# Patient Record
Sex: FEMALE | Race: WHITE | Employment: FULL TIME | URBAN - METROPOLITAN AREA
[De-identification: names, ages, dates, MRNs, and addresses within clinical notes are randomized per-mention and may not be internally consistent; named-entity substitution may affect disease eponyms.]

---

## 2019-06-22 ENCOUNTER — HOSPITAL ENCOUNTER (EMERGENCY)
Age: 54
Discharge: HOME | End: 2019-06-22
Attending: EMERGENCY MEDICINE
Payer: COMMERCIAL

## 2019-06-22 VITALS
RESPIRATION RATE: 18 BRPM | WEIGHT: 190 LBS | SYSTOLIC BLOOD PRESSURE: 157 MMHG | DIASTOLIC BLOOD PRESSURE: 79 MMHG | HEIGHT: 60 IN | OXYGEN SATURATION: 99 % | TEMPERATURE: 98.9 F | HEART RATE: 64 BPM | BODY MASS INDEX: 37.3 KG/M2

## 2019-06-22 DIAGNOSIS — M26.629 TMJ SYNDROME: Primary | ICD-10-CM

## 2019-06-22 PROCEDURE — 74011636637 HC RX REV CODE- 636/637: Performed by: EMERGENCY MEDICINE

## 2019-06-22 PROCEDURE — 99283 EMERGENCY DEPT VISIT LOW MDM: CPT | Performed by: EMERGENCY MEDICINE

## 2019-06-22 RX ORDER — TRAMADOL HYDROCHLORIDE 50 MG/1
50 TABLET ORAL
Qty: 6 TAB | Refills: 0 | Status: SHIPPED | OUTPATIENT
Start: 2019-06-22 | End: 2019-06-25

## 2019-06-22 RX ORDER — PREDNISONE 10 MG/1
40 TABLET ORAL
Status: COMPLETED | OUTPATIENT
Start: 2019-06-22 | End: 2019-06-22

## 2019-06-22 RX ORDER — PREDNISONE 20 MG/1
40 TABLET ORAL DAILY
Qty: 10 TAB | Refills: 0 | Status: SHIPPED | OUTPATIENT
Start: 2019-06-22 | End: 2019-06-27

## 2019-06-22 RX ADMIN — PREDNISONE 40 MG: 10 TABLET ORAL at 09:09

## 2019-06-22 NOTE — ED PROVIDER NOTES
58-year-old white female presents with right ear and face pain onset gradually yesterday and worse this morning. Pain is aggravated by palpation, movement and chewing. She does not have any dental discomfort. No difficulty breathing or swallowing. No fever. No respiratory symptoms. The history is provided by the patient. Facial Pain    Pertinent negatives include no vomiting.         Past Medical History:   Diagnosis Date    Acute pancreatitis 4/10/2012    CAD (coronary artery disease)     heart murmur    Endometriosis     Heart murmur     Hepatic steatosis 4/11/2012    Hepatic steatosis 4/11/2012    Other ill-defined conditions(799.89)     endometriosis    Pancreatitis, gallstone 4/11/2012       Past Surgical History:   Procedure Laterality Date    HX APPENDECTOMY      lap x2    HX CHOLECYSTECTOMY      HX GYN  tubal ligation    HX ORTHOPAEDIC      right knee x2         Family History:   Problem Relation Age of Onset    Heart Disease Other        Social History     Socioeconomic History    Marital status:      Spouse name: Not on file    Number of children: Not on file    Years of education: Not on file    Highest education level: Not on file   Occupational History    Not on file   Social Needs    Financial resource strain: Not on file    Food insecurity:     Worry: Not on file     Inability: Not on file    Transportation needs:     Medical: Not on file     Non-medical: Not on file   Tobacco Use    Smoking status: Never Smoker    Smokeless tobacco: Never Used   Substance and Sexual Activity    Alcohol use: No     Comment: occassionally    Drug use: No    Sexual activity: Yes     Partners: Male     Birth control/protection: Surgical   Lifestyle    Physical activity:     Days per week: Not on file     Minutes per session: Not on file    Stress: Not on file   Relationships    Social connections:     Talks on phone: Not on file     Gets together: Not on file     Attends Anabaptist service: Not on file     Active member of club or organization: Not on file     Attends meetings of clubs or organizations: Not on file     Relationship status: Not on file    Intimate partner violence:     Fear of current or ex partner: Not on file     Emotionally abused: Not on file     Physically abused: Not on file     Forced sexual activity: Not on file   Other Topics Concern    Not on file   Social History Narrative    Not on file         ALLERGIES: Advil [ibuprofen] and Aleve [naproxen sodium]    Review of Systems   Constitutional: Negative for fever. HENT: Negative for congestion. Respiratory: Negative for cough. Gastrointestinal: Negative for abdominal pain and vomiting. Musculoskeletal: Negative for back pain and neck pain. Skin: Negative for rash. Neurological: Negative for headaches. Vitals:    06/22/19 0821   BP: 153/73   Pulse: 60   Resp: 16   Temp: 99.1 °F (37.3 °C)   SpO2: 99%   Weight: 86.2 kg (190 lb)   Height: 5' (1.524 m)            Physical Exam   Constitutional: She appears well-developed and well-nourished. No distress. HENT:   Head: Normocephalic and atraumatic. Mouth/Throat: Oropharynx is clear and moist. No oropharyngeal exudate. Uvula midline. Posterior pharynx is normal in appearance. There is tenderness to palpation of the right TMJ. TMs are normal in appearance. Eyes: Pupils are equal, round, and reactive to light. Conjunctivae and EOM are normal.   Neck: Normal range of motion. Neck supple. Cardiovascular: Normal rate. Pulmonary/Chest: Effort normal and breath sounds normal.   Lymphadenopathy:     She has no cervical adenopathy. Neurological: She is alert. Skin: Skin is warm and dry. Psychiatric: She has a normal mood and affect. Her behavior is normal.   Nursing note and vitals reviewed.        MDM  Number of Diagnoses or Management Options  Diagnosis management comments: No findings on exam to suggest sinusitis, dental infection, pharyngitis or otitis media. Considering she has pain to the TMJ with palpation and chewing. I suspect this is TMJ syndrome. We'll treat with prednisone and tramadol.        Amount and/or Complexity of Data Reviewed  Tests in the medicine section of CPT®: ordered and reviewed    Risk of Complications, Morbidity, and/or Mortality  Presenting problems: low  Diagnostic procedures: low  Management options: low           Procedures

## 2019-06-22 NOTE — DISCHARGE INSTRUCTIONS
Patient Education        Temporomandibular Disorder: Care Instructions  Your Care Instructions    Temporomandibular (TM) disorders are a problem with the muscles and joints that connect your jaw to your skull. They cause pain when you open your mouth, chew, or yawn. You may feel this pain on one or both sides. TM disorders are often caused by tight jaw muscles. The tightness can be caused by clenching or grinding your teeth. This may happen when you have a lot of stress in your life. If you lower your stress, you may be able to stop clenching or grinding your teeth. This will help relax your jaw and reduce your pain. You may also be able to do some things at home to feel better. But if none of this works, your doctor may prescribe medicine to help relax your muscles and control the pain. Follow-up care is a key part of your treatment and safety. Be sure to make and go to all appointments, and call your doctor if you are having problems. It's also a good idea to know your test results and keep a list of the medicines you take. How can you care for yourself at home? · Put a warm, moist cloth or heating pad set on low on your jaw. Do this for 10 to 20 minutes at a time. Put a thin cloth between the heating pad and your skin. · Avoid hard or chewy foods that cause your jaws to work very hard. Examples include popcorn, jerky, tough meats, chewy breads, gum, and raw apples and carrots. · Choose softer foods that are easy to chew. These include eggs, yogurt, and soup. · Cut your food into small pieces. Chew slowly. · If your jaw gets too painful to chew, or if it locks, you may need to puree your food for a few days or weeks. · To relax your jaw, repeat this exercise for a few minutes every morning and evening. Watch yourself in a mirror. Gently open and close your mouth. Move your jaw straight up and down. But don't do this if it makes your pain worse.   · Get at least 30 minutes of exercise on most days of the week to relieve stress. Walking is a good choice. You also may want to do other activities, such as running, swimming, cycling, or playing tennis or team sports. · Do not:  ? Hold a phone between your shoulder and your jaw. ? Open your mouth all the way, like when you sing loudly or yawn. ? Clench or grind your teeth, bite your lips, or chew your fingernails. ? Clench things such as pens, pipes, or cigars between your teeth. When should you call for help? Call your doctor now or seek immediate medical care if:    · Your jaw is locked open or shut or it is hard to move your jaw.    Watch closely for changes in your health, and be sure to contact your doctor if:    · Your jaw pain gets worse.     · Your face is swollen.     · You do not get better as expected. Where can you learn more? Go to http://sarah-amy.info/. Enter J835 in the search box to learn more about \"Temporomandibular Disorder: Care Instructions. \"  Current as of: March 27, 2018  Content Version: 11.9  © 1056-0702 [employer]. Care instructions adapted under license by [employer] (which disclaims liability or warranty for this information). If you have questions about a medical condition or this instruction, always ask your healthcare professional. [address] any warranty or liability for your use of this information.

## 2019-06-22 NOTE — ED TRIAGE NOTES
Patient advises that she started with some aching pain to right side of face yesterday and it has progressed throughout the night. Patient advises pain to ear, into jaw and increased pain with any jaw movement. Patient advises that she has not had any recent trauma, no known fever. Patient is is with redness to right side of face, stating she has been using a hot compress with mild relief.

## 2019-06-22 NOTE — ED NOTES
I have reviewed discharge instructions with the patient. The patient verbalized understanding. Patient left ED via Discharge Method: ambulatory to Home with self. Opportunity for questions and clarification provided. Patient given 2 scripts. Ricardo Garcia RN    To continue your aftercare when you leave the hospital, you may receive an automated call from our care team to check in on how you are doing. This is a free service and part of our promise to provide the best care and service to meet your aftercare needs.  If you have questions, or wish to unsubscribe from this service please call [phone]. Thank you for Choosing our [facility] Emergency Department.

## 2021-09-28 ENCOUNTER — APPOINTMENT (OUTPATIENT)
Dept: GENERAL RADIOLOGY | Age: 56
End: 2021-09-28
Attending: PHYSICIAN ASSISTANT
Payer: COMMERCIAL

## 2021-09-28 ENCOUNTER — HOSPITAL ENCOUNTER (EMERGENCY)
Age: 56
Discharge: HOME | End: 2021-09-28
Attending: STUDENT IN AN ORGANIZED HEALTH CARE EDUCATION/TRAINING PROGRAM
Payer: COMMERCIAL

## 2021-09-28 VITALS
BODY MASS INDEX: 37.3 KG/M2 | WEIGHT: 190 LBS | TEMPERATURE: 99.8 F | SYSTOLIC BLOOD PRESSURE: 124 MMHG | HEIGHT: 60 IN | OXYGEN SATURATION: 93 % | HEART RATE: 84 BPM | DIASTOLIC BLOOD PRESSURE: 88 MMHG | RESPIRATION RATE: 16 BRPM

## 2021-09-28 DIAGNOSIS — Z20.822 SUSPECTED COVID-19 VIRUS INFECTION: Primary | ICD-10-CM

## 2021-09-28 LAB
COVID-19 RAPID TEST, COVR: NOT DETECTED
SARS-COV-2, COV2: NORMAL
SOURCE, COVRS: NORMAL

## 2021-09-28 PROCEDURE — 71046 X-RAY EXAM CHEST 2 VIEWS: CPT

## 2021-09-28 PROCEDURE — U0004 COV-19 TEST NON-CDC HGH THRU: HCPCS

## 2021-09-28 PROCEDURE — 99281 EMR DPT VST MAYX REQ PHY/QHP: CPT

## 2021-09-28 PROCEDURE — 87635 SARS-COV-2 COVID-19 AMP PRB: CPT

## 2021-09-28 NOTE — Clinical Note
[facility]  [address] [address] [phone]    Work/School Note    Date: 9/28/2021     To Whom It May concern:    Louise Bueno was evaulated by the following provider(s):  Attending Provider: Fior Hernandez DO  Physician Assistant: Debra Montano, [address] virus is suspected. Per the CDC guidelines we recommend home isolation until the following conditions are all met:    1. At least 10 days have passed since symptoms first appeared and  2. At least 24 hours have passed since last fever without the use of fever-reducing medications and  3.  Symptoms (e.g., cough, shortness of breath) have improved    Sincerely,          MIKIE Zheng

## 2021-09-28 NOTE — ED PROVIDER NOTES
Patient is here with nasal congestion, dry cough, body aches, chills, fever and not feeling well for 2 days now. She did not get a COVID vaccination. No chest pain or shortness of breath, abdominal pain, dizziness, dyspnea on exertion, orthopnea, neck pain/stiffness, rash, swelling of her arms or legs, trouble with urination or bowel movements or other symptoms. She was ambulatory to the room without difficulty, and well-hydrated. The history is provided by the patient. Cough  This is a new problem. The current episode started yesterday. The problem occurs every few minutes. The problem has not changed since onset. The cough is non-productive. Patient reports a subjective fever - was not measured. Associated symptoms include chills, rhinorrhea and myalgias. Pertinent negatives include no chest pain, no sweats, no weight loss, no eye redness, no ear congestion, no ear pain, no headaches, no sore throat, no shortness of breath, no wheezing, no nausea, no vomiting and no confusion. She has tried nothing for the symptoms. She is not a smoker. Her past medical history does not include bronchitis, pneumonia, bronchiectasis, COPD, emphysema, asthma, cancer, heart failure or CHF.         Past Medical History:   Diagnosis Date    Acute pancreatitis 4/10/2012    CAD (coronary artery disease)     heart murmur    Endometriosis     Heart murmur     Hepatic steatosis 4/11/2012    Hepatic steatosis 4/11/2012    Other ill-defined conditions(799.89)     endometriosis    Pancreatitis, gallstone 4/11/2012       Past Surgical History:   Procedure Laterality Date    HX APPENDECTOMY      lap x2    HX CHOLECYSTECTOMY      HX GYN  tubal ligation    HX ORTHOPAEDIC      right knee x2         Family History:   Problem Relation Age of Onset    Heart Disease Other        Social History     Socioeconomic History    Marital status:      Spouse name: Not on file    Number of children: Not on file    Years of education: Not on file    Highest education level: Not on file   Occupational History    Not on file   Tobacco Use    Smoking status: Never Smoker    Smokeless tobacco: Never Used   Substance and Sexual Activity    Alcohol use: No     Comment: occassionally    Drug use: No    Sexual activity: Yes     Partners: Male     Birth control/protection: Surgical   Other Topics Concern    Not on file   Social History Narrative    Not on file     Social Determinants of Health     Financial Resource Strain:     Difficulty of Paying Living Expenses:    Food Insecurity:     Worried About Running Out of Food in the Last Year:     [address] in the Last Year:    Transportation Needs:     Lack of Transportation (Medical):  Lack of Transportation (Non-Medical):    Physical Activity:     Days of Exercise per Week:     Minutes of Exercise per Session:    Stress:     Feeling of Stress :    Social Connections:     Frequency of Communication with Friends and Family:     Frequency of Social Gatherings with Friends and Family:     Attends Muslim Services:     Active Member of Clubs or Organizations:     Attends Club or Organization Meetings:     Marital Status:    Intimate Partner Violence:     Fear of Current or Ex-Partner:     Emotionally Abused:     Physically Abused:     Sexually Abused: ALLERGIES: Advil [ibuprofen] and Aleve [naproxen sodium]    Review of Systems   Constitutional: Positive for chills. Negative for weight loss. HENT: Positive for rhinorrhea. Negative for ear pain and sore throat. Eyes: Negative. Negative for redness. Respiratory: Positive for cough. Negative for shortness of breath and wheezing. Cardiovascular: Negative. Negative for chest pain. Gastrointestinal: Negative. Negative for nausea and vomiting. Genitourinary: Negative. Musculoskeletal: Positive for myalgias. Skin: Negative. Neurological: Negative. Negative for headaches.    Psychiatric/Behavioral: Negative. Negative for confusion. All other systems reviewed and are negative. Vitals:    09/28/21 1352   BP: 124/88   Pulse: 84   Resp: 16   Temp: 99.8 °F (37.7 °C)   SpO2: 93%   Weight: 86.2 kg (190 lb)   Height: 5' (1.524 m)            Physical Exam  Vitals and nursing note reviewed. Constitutional:       Appearance: She is well-developed. HENT:      Head: Normocephalic and atraumatic. Right Ear: External ear normal.      Left Ear: External ear normal.      Nose: Nose normal.      Mouth/Throat:      Mouth: Mucous membranes are moist.   Eyes:      Conjunctiva/sclera: Conjunctivae normal.      Pupils: Pupils are equal, round, and reactive to light. Cardiovascular:      Rate and Rhythm: Normal rate and regular rhythm. Pulses: Normal pulses. Heart sounds: Normal heart sounds. Pulmonary:      Effort: Pulmonary effort is normal.      Breath sounds: Normal breath sounds. Abdominal:      General: Bowel sounds are normal.      Palpations: Abdomen is soft. Musculoskeletal:         General: Normal range of motion. Cervical back: Normal range of motion and neck supple. Skin:     General: Skin is warm and dry. Capillary Refill: Capillary refill takes less than 2 seconds. Neurological:      General: No focal deficit present. Mental Status: She is alert and oriented to person, place, and time. Deep Tendon Reflexes: Reflexes are normal and symmetric. Psychiatric:         Mood and Affect: Mood normal.         Behavior: Behavior normal.         Thought Content: Thought content normal.         Judgment: Judgment normal.          MDM  Number of Diagnoses or Management Options     Amount and/or Complexity of Data Reviewed  Clinical lab tests: ordered    Risk of Complications, Morbidity, and/or Mortality  Presenting problems: moderate  Diagnostic procedures: moderate  Management options: moderate           Procedures  The patient was observed in the ED.     Results Reviewed:      Recent Results (from the past 24 hour(s))   COVID-19 RAPID TEST    Collection Time: 09/28/21  2:00 PM   Result Value Ref Range    Specimen source NASAL SWAB      COVID-19 rapid test Not detected NOTD       XR CHEST PA LAT   Final Result   NO ACUTE CARDIOPULMONARY DISEASE IDENTIFIED. PCR Covid test done, rapid was negative here. CXR normal.  Patient was instructed to stay home and quarantined until the results have returned. She should drink plenty of fluids, rest, use ibuprofen and/or Tylenol as directed if needed for fever or body aches. Over-the-counter cough suppressants as directed if needed for that. Check on oxygen saturations and if staying below 90%, return to the ED. Return to the ED if worsening in any way. Patient is stable for discharge and ambulatory out of the ED without difficulty at this time. I discussed the results of all labs, procedures, radiographs, and treatments with the patient and available family. Treatment plan is agreed upon and the patient is ready for discharge. All voiced understanding of the discharge plan and medication instructions or changes as appropriate. Questions about treatment in the ED were answered. All were encouraged to return should symptoms worsen or new problems develop.

## 2021-09-28 NOTE — ED NOTES
I have reviewed discharge instructions with the patient. The patient verbalized understanding. Patient left ED via Discharge Method: ambulatory to Home with self    Opportunity for questions and clarification provided. Patient given 0 scripts. Pulse oximeter provided. Work note provided. To continue your aftercare when you leave the hospital, you may receive an automated call from our care team to check in on how you are doing. This is a free service and part of our promise to provide the best care and service to meet your aftercare needs.  If you have questions, or wish to unsubscribe from this service please call [phone]. Thank you for Choosing our [facility] Emergency Department.

## 2021-09-29 LAB — SARS COV-2, XPGCVT: NEGATIVE
